# Patient Record
Sex: MALE | Race: WHITE | NOT HISPANIC OR LATINO
[De-identification: names, ages, dates, MRNs, and addresses within clinical notes are randomized per-mention and may not be internally consistent; named-entity substitution may affect disease eponyms.]

---

## 2019-03-06 ENCOUNTER — APPOINTMENT (OUTPATIENT)
Dept: CV DIAGNOSITCS | Facility: HOSPITAL | Age: 59
End: 2019-03-06

## 2019-03-06 ENCOUNTER — OUTPATIENT (OUTPATIENT)
Dept: OUTPATIENT SERVICES | Facility: HOSPITAL | Age: 59
LOS: 1 days | End: 2019-03-06
Payer: COMMERCIAL

## 2019-03-06 DIAGNOSIS — I25.10 ATHEROSCLEROTIC HEART DISEASE OF NATIVE CORONARY ARTERY WITHOUT ANGINA PECTORIS: ICD-10-CM

## 2019-03-06 PROCEDURE — 93312 ECHO TRANSESOPHAGEAL: CPT | Mod: 26

## 2019-03-06 PROCEDURE — 93306 TTE W/DOPPLER COMPLETE: CPT

## 2019-03-06 PROCEDURE — 93306 TTE W/DOPPLER COMPLETE: CPT | Mod: 26

## 2019-03-06 PROCEDURE — 93312 ECHO TRANSESOPHAGEAL: CPT

## 2021-06-29 ENCOUNTER — APPOINTMENT (OUTPATIENT)
Dept: PULMONOLOGY | Facility: CLINIC | Age: 61
End: 2021-06-29
Payer: COMMERCIAL

## 2021-06-29 VITALS
OXYGEN SATURATION: 97 % | BODY MASS INDEX: 31.52 KG/M2 | WEIGHT: 208 LBS | SYSTOLIC BLOOD PRESSURE: 143 MMHG | DIASTOLIC BLOOD PRESSURE: 77 MMHG | TEMPERATURE: 98.2 F | HEIGHT: 68 IN | HEART RATE: 67 BPM

## 2021-06-29 DIAGNOSIS — G47.33 OBSTRUCTIVE SLEEP APNEA (ADULT) (PEDIATRIC): ICD-10-CM

## 2021-06-29 PROCEDURE — 99203 OFFICE O/P NEW LOW 30 MIN: CPT

## 2021-06-29 PROCEDURE — 99072 ADDL SUPL MATRL&STAF TM PHE: CPT

## 2021-06-29 NOTE — HISTORY OF PRESENT ILLNESS
[TextBox_4] : Follow-up for sleep apnea.  Known ANI last visit with me in 2016.  History of severe ANI AHI 51.9 on prior sleep testing.  He uses 2 devices as he has 2 different homes.  I have the compliance data from one of the devices which shows excellent compliance on those nights he reports no symptoms of daytime sleepiness he has lost significant weight since his last sleep testing but still feels much better when sleeping with CPAP.\par \par He is here for several reasons.  He needs updated supplies.  He also wishes to purchase a portable machine as he often spends time in [place] and when he is there he is moving between multiple residences.  He does not use the modification with his CPAP.  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

## 2021-06-29 NOTE — ASSESSMENT
[FreeTextEntry1] : Patient is currently on treatment with PAP. Data download confirms excellent compliance with above conditions noted patient continues to use treatment and is benefiting from it.\par I have requested that he send me data card from both devices in order that I may generate an integrated compliance report for him.\par We discussed different portable CPAP devices and he will purchase an appropriate one  that will suit his needs.

## 2021-07-09 ENCOUNTER — TRANSCRIPTION ENCOUNTER (OUTPATIENT)
Age: 61
End: 2021-07-09

## 2023-01-20 NOTE — PROCEDURE
[FreeTextEntry1] : Compliance report from available device shows AHI of 0.6 events per hour with nightly usage of 6 hours and 58 minutes on the days used.  There are multiple days of nonusage which reportedly the patient is using a different machine.\par \par ESS score 1 No